# Patient Record
(demographics unavailable — no encounter records)

---

## 2025-06-22 NOTE — PHYSICAL EXAM
[Normal Appearance] : normal appearance [Well Groomed] : well groomed [Edema] : no peripheral edema [General Appearance - In No Acute Distress] : no acute distress [Respiration, Rhythm And Depth] : normal respiratory rhythm and effort [Exaggerated Use Of Accessory Muscles For Inspiration] : no accessory muscle use [Abdomen Tenderness] : non-tender [Abdomen Soft] : soft [Costovertebral Angle Tenderness] : no ~M costovertebral angle tenderness [Urinary Bladder Findings] : the bladder was normal on palpation [Normal Station and Gait] : the gait and station were normal for the patient's age [] : no rash [No Focal Deficits] : no focal deficits [Oriented To Time, Place, And Person] : oriented to person, place, and time [Affect] : the affect was normal [Mood] : the mood was normal [No Palpable Adenopathy] : no palpable adenopathy

## 2025-06-23 NOTE — ASSESSMENT
[FreeTextEntry1] : Discussed PSA results with patient- remains undetectable. Repeat PSA in 6 months. Next follow-up appointment in 6 months. All questions answered and patient agreeable with plan. 20-minute discussion for prostate cancer follow-up and review of labs. Time spent is for reviewing chart, labs and images (if available), counseling and care coordination.

## 2025-06-23 NOTE — ADDENDUM
[FreeTextEntry1] : ILeanna NP, assisted with documentation on 06/22/2025 in the presence and under the direction of Dr. Ronal Carlos.

## 2025-06-23 NOTE — HISTORY OF PRESENT ILLNESS
[FreeTextEntry1] : 59-year-old male presents today for follow-up for PSA surveillance. S/p RALP 3/7/19  GS7 (3+4)  Recent PSA: <0.01 (5/23/25) Previous PSA: <0.01 (12/9/24)  Pt is doing well overall. No complaints.